# Patient Record
Sex: FEMALE | Race: WHITE | Employment: PART TIME | URBAN - METROPOLITAN AREA
[De-identification: names, ages, dates, MRNs, and addresses within clinical notes are randomized per-mention and may not be internally consistent; named-entity substitution may affect disease eponyms.]

---

## 2019-03-12 ENCOUNTER — OFFICE VISIT (OUTPATIENT)
Dept: INTERNAL MEDICINE CLINIC | Age: 29
End: 2019-03-12
Payer: COMMERCIAL

## 2019-03-12 ENCOUNTER — HOSPITAL ENCOUNTER (OUTPATIENT)
Age: 29
Setting detail: SPECIMEN
Discharge: HOME | End: 2019-03-12
Payer: COMMERCIAL

## 2019-03-12 VITALS
BODY MASS INDEX: 29.04 KG/M2 | DIASTOLIC BLOOD PRESSURE: 60 MMHG | OXYGEN SATURATION: 100 % | HEIGHT: 62 IN | WEIGHT: 157.8 LBS | HEART RATE: 79 BPM | SYSTOLIC BLOOD PRESSURE: 122 MMHG

## 2019-03-12 DIAGNOSIS — F32.A ANXIETY AND DEPRESSION: ICD-10-CM

## 2019-03-12 DIAGNOSIS — F41.9 ANXIETY AND DEPRESSION: ICD-10-CM

## 2019-03-12 DIAGNOSIS — K21.9 GASTROESOPHAGEAL REFLUX DISEASE WITHOUT ESOPHAGITIS: ICD-10-CM

## 2019-03-12 DIAGNOSIS — R63.5 WEIGHT GAIN: ICD-10-CM

## 2019-03-12 DIAGNOSIS — Z72.0 TOBACCO ABUSE: ICD-10-CM

## 2019-03-12 DIAGNOSIS — F31.9 BIPOLAR 1 DISORDER (HCC): ICD-10-CM

## 2019-03-12 DIAGNOSIS — F10.10 ETOH ABUSE: ICD-10-CM

## 2019-03-12 DIAGNOSIS — N96 RECURRENT PREGNANCY LOSS: ICD-10-CM

## 2019-03-12 DIAGNOSIS — Z71.6 TOBACCO ABUSE COUNSELING: ICD-10-CM

## 2019-03-12 DIAGNOSIS — Z76.89 ENCOUNTER TO ESTABLISH CARE WITH NEW DOCTOR: Primary | ICD-10-CM

## 2019-03-12 DIAGNOSIS — J45.40 MODERATE PERSISTENT ASTHMA WITHOUT COMPLICATION: ICD-10-CM

## 2019-03-12 DIAGNOSIS — R53.83 FATIGUE, UNSPECIFIED TYPE: ICD-10-CM

## 2019-03-12 LAB — TSH SERPL DL<=0.05 MIU/L-ACNC: 1.29 MIU/L (ref 0.3–5)

## 2019-03-12 PROCEDURE — 99204 OFFICE O/P NEW MOD 45 MIN: CPT | Performed by: FAMILY MEDICINE

## 2019-03-12 PROCEDURE — G8427 DOCREV CUR MEDS BY ELIG CLIN: HCPCS | Performed by: FAMILY MEDICINE

## 2019-03-12 PROCEDURE — 4004F PT TOBACCO SCREEN RCVD TLK: CPT | Performed by: FAMILY MEDICINE

## 2019-03-12 PROCEDURE — G8484 FLU IMMUNIZE NO ADMIN: HCPCS | Performed by: FAMILY MEDICINE

## 2019-03-12 PROCEDURE — G8419 CALC BMI OUT NRM PARAM NOF/U: HCPCS | Performed by: FAMILY MEDICINE

## 2019-03-12 RX ORDER — ALBUTEROL SULFATE 90 UG/1
2 AEROSOL, METERED RESPIRATORY (INHALATION) EVERY 6 HOURS PRN
Qty: 1 INHALER | Refills: 3 | Status: SHIPPED | OUTPATIENT
Start: 2019-03-12

## 2019-03-12 RX ORDER — OXCARBAZEPINE 150 MG/1
150 TABLET, FILM COATED ORAL 2 TIMES DAILY
Qty: 60 TABLET | Refills: 3 | Status: SHIPPED | OUTPATIENT
Start: 2019-03-12

## 2019-03-12 RX ORDER — SERTRALINE HYDROCHLORIDE 25 MG/1
25 TABLET, FILM COATED ORAL DAILY
Qty: 30 TABLET | Refills: 3 | Status: SHIPPED | OUTPATIENT
Start: 2019-03-12

## 2019-03-12 RX ORDER — FLUTICASONE PROPIONATE 110 UG/1
1 AEROSOL, METERED RESPIRATORY (INHALATION) 2 TIMES DAILY
Qty: 1 INHALER | Refills: 2 | Status: SHIPPED | OUTPATIENT
Start: 2019-03-12

## 2019-03-12 RX ORDER — PANTOPRAZOLE SODIUM 40 MG/1
40 TABLET, DELAYED RELEASE ORAL
Qty: 30 TABLET | Refills: 0 | Status: SHIPPED | OUTPATIENT
Start: 2019-03-12 | End: 2019-12-27 | Stop reason: SDUPTHER

## 2019-03-12 SDOH — HEALTH STABILITY: MENTAL HEALTH: HOW OFTEN DO YOU HAVE A DRINK CONTAINING ALCOHOL?: 2-4 TIMES A MONTH

## 2019-03-12 ASSESSMENT — ENCOUNTER SYMPTOMS
RESPIRATORY NEGATIVE: 1
ALLERGIC/IMMUNOLOGIC NEGATIVE: 1
GASTROINTESTINAL NEGATIVE: 1
EYES NEGATIVE: 1

## 2019-03-12 ASSESSMENT — PATIENT HEALTH QUESTIONNAIRE - PHQ9
SUM OF ALL RESPONSES TO PHQ QUESTIONS 1-9: 0
1. LITTLE INTEREST OR PLEASURE IN DOING THINGS: 0
SUM OF ALL RESPONSES TO PHQ QUESTIONS 1-9: 0
2. FEELING DOWN, DEPRESSED OR HOPELESS: 0
SUM OF ALL RESPONSES TO PHQ9 QUESTIONS 1 & 2: 0

## 2019-12-27 ENCOUNTER — OFFICE VISIT (OUTPATIENT)
Dept: INTERNAL MEDICINE CLINIC | Age: 29
End: 2019-12-27
Payer: COMMERCIAL

## 2019-12-27 VITALS
WEIGHT: 140.2 LBS | OXYGEN SATURATION: 99 % | HEART RATE: 69 BPM | SYSTOLIC BLOOD PRESSURE: 90 MMHG | RESPIRATION RATE: 18 BRPM | BODY MASS INDEX: 25.8 KG/M2 | TEMPERATURE: 97.4 F | DIASTOLIC BLOOD PRESSURE: 70 MMHG | HEIGHT: 62 IN

## 2019-12-27 DIAGNOSIS — F10.10 ETOH ABUSE: ICD-10-CM

## 2019-12-27 DIAGNOSIS — K21.00 GASTROESOPHAGEAL REFLUX DISEASE WITH ESOPHAGITIS: ICD-10-CM

## 2019-12-27 DIAGNOSIS — J45.40 MODERATE PERSISTENT ASTHMA WITHOUT COMPLICATION: ICD-10-CM

## 2019-12-27 DIAGNOSIS — F41.9 ANXIETY AND DEPRESSION: ICD-10-CM

## 2019-12-27 DIAGNOSIS — Z71.6 TOBACCO ABUSE COUNSELING: ICD-10-CM

## 2019-12-27 DIAGNOSIS — F31.9 BIPOLAR 1 DISORDER (HCC): ICD-10-CM

## 2019-12-27 DIAGNOSIS — F32.A ANXIETY AND DEPRESSION: ICD-10-CM

## 2019-12-27 DIAGNOSIS — M54.50 CHRONIC BILATERAL LOW BACK PAIN WITHOUT SCIATICA: ICD-10-CM

## 2019-12-27 DIAGNOSIS — Z72.0 TOBACCO ABUSE: ICD-10-CM

## 2019-12-27 DIAGNOSIS — G89.29 CHRONIC BILATERAL LOW BACK PAIN WITHOUT SCIATICA: ICD-10-CM

## 2019-12-27 DIAGNOSIS — F17.200 CURRENT SMOKER: ICD-10-CM

## 2019-12-27 DIAGNOSIS — J45.20 MILD INTERMITTENT ASTHMA WITHOUT COMPLICATION: ICD-10-CM

## 2019-12-27 DIAGNOSIS — Z91.199 MEDICALLY NONCOMPLIANT: ICD-10-CM

## 2019-12-27 DIAGNOSIS — G43.009 MIGRAINE WITHOUT AURA AND WITHOUT STATUS MIGRAINOSUS, NOT INTRACTABLE: ICD-10-CM

## 2019-12-27 DIAGNOSIS — R13.10 DYSPHAGIA, UNSPECIFIED TYPE: Primary | ICD-10-CM

## 2019-12-27 PROBLEM — Z76.89 ENCOUNTER TO ESTABLISH CARE WITH NEW DOCTOR: Status: RESOLVED | Noted: 2019-03-12 | Resolved: 2019-12-27

## 2019-12-27 PROBLEM — R53.83 FATIGUE: Status: RESOLVED | Noted: 2019-03-12 | Resolved: 2019-12-27

## 2019-12-27 PROCEDURE — G8484 FLU IMMUNIZE NO ADMIN: HCPCS | Performed by: FAMILY MEDICINE

## 2019-12-27 PROCEDURE — G8419 CALC BMI OUT NRM PARAM NOF/U: HCPCS | Performed by: FAMILY MEDICINE

## 2019-12-27 PROCEDURE — G8427 DOCREV CUR MEDS BY ELIG CLIN: HCPCS | Performed by: FAMILY MEDICINE

## 2019-12-27 PROCEDURE — 99214 OFFICE O/P EST MOD 30 MIN: CPT | Performed by: FAMILY MEDICINE

## 2019-12-27 PROCEDURE — 4004F PT TOBACCO SCREEN RCVD TLK: CPT | Performed by: FAMILY MEDICINE

## 2019-12-27 ASSESSMENT — ENCOUNTER SYMPTOMS
GLOBUS SENSATION: 1
RESPIRATORY NEGATIVE: 1
ALLERGIC/IMMUNOLOGIC NEGATIVE: 1
EYES NEGATIVE: 1

## 2019-12-30 ENCOUNTER — TELEPHONE (OUTPATIENT)
Dept: GASTROENTEROLOGY | Age: 29
End: 2019-12-30

## 2019-12-31 RX ORDER — PANTOPRAZOLE SODIUM 40 MG/1
40 TABLET, DELAYED RELEASE ORAL
Qty: 30 TABLET | Refills: 2 | Status: SHIPPED | OUTPATIENT
Start: 2019-12-31

## 2020-01-09 ENCOUNTER — TELEPHONE (OUTPATIENT)
Dept: GASTROENTEROLOGY | Age: 30
End: 2020-01-09

## 2020-01-09 NOTE — TELEPHONE ENCOUNTER
Lvm to call and schedule appt from referral - 2nd attempt  Letter sent to call and schedule appt - 3rd attempt

## 2020-05-15 LAB
ABSOLUTE IMMATURE GRANULOCYTE: 0 10*3/UL (ref 0–0.2)
BASOPHILS ABSOLUTE: 0.1 10*3/UL (ref 0–0.2)
BASOPHILS RELATIVE PERCENT: 0.4 % (ref 0–1)
BUN BLDV-MCNC: 5 MG/DL (ref 7–25)
CALCIUM SERPL-MCNC: 9.4 MG/DL (ref 8.6–10.3)
CHLORIDE BLD-SCNC: 104 MEQ/L (ref 98–107)
CO2: 30 MEQ/L (ref 21–31)
CREAT SERPL-MCNC: 0.78 MG/DL (ref 0.6–1.2)
EGFR AFRICAN AMERICAN: >60 ML/MIN/1.73SQ M
EGFR IF NONAFRICAN AMERICAN: >60 ML/MIN/1.73SQ M
EOSINOPHILS ABSOLUTE: 0.3 10*3/UL (ref 0–0.5)
EOSINOPHILS RELATIVE PERCENT: 2.4 % (ref 0–6)
GLUCOSE: 74 MG/DL (ref 70–100)
HCT VFR BLD CALC: 44.1 % (ref 36–45)
HEMOGLOBIN: 13.9 G/DL (ref 12–15)
IMMATURE GRANULOCYTES: 0.2 % (ref 0–1)
INR BLD: 1.07 (ref 0.91–1.16)
LYMPHOCYTES ABSOLUTE: 2.3 10*3/UL (ref 1.2–4)
LYMPHOCYTES RELATIVE PERCENT: 18.2 % (ref 20–45)
MCH RBC QN AUTO: 27.9 PG (ref 27–33)
MCHC RBC AUTO-ENTMCNC: 31.5 G/DL (ref 32–35)
MCV RBC AUTO: 88.4 FL (ref 82–98)
MONOCYTES ABSOLUTE: 0.8 10*3/UL (ref 0.1–1)
MONOCYTES RELATIVE PERCENT: 6.7 % (ref 5–12)
NEUTROPHILS ABSOLUTE: 8.9 10*3/UL (ref 1.6–7.6)
NEUTROPHILS RELATIVE PERCENT: 72.1 % (ref 40–72)
NUCLEATED RED BLOOD CELLS: 0 % (ref 0–0)
PDW BLD-RTO: 14 % (ref 11.5–15)
PLATELET # BLD: 295 10*3/UL (ref 150–400)
POTASSIUM SERPL-SCNC: 3.6 MEQ/L (ref 3.5–5.1)
PT: 13.9 SEC (ref 12.3–14.8)
RBC: 4.99 10*6/UL (ref 3.8–5)
SODIUM BLD-SCNC: 138 MEQ/L (ref 136–145)
WBC: 12.34 10*3/UL (ref 4–10.6)

## 2020-05-17 LAB — CLINICAL REPORT: NORMAL

## 2024-08-12 ENCOUNTER — HOSPITAL ENCOUNTER (EMERGENCY)
Facility: CLINIC | Age: 34
Discharge: HOME | End: 2024-08-12
Attending: EMERGENCY MEDICINE
Payer: COMMERCIAL

## 2024-08-12 ENCOUNTER — APPOINTMENT (OUTPATIENT)
Dept: GENERAL RADIOLOGY | Facility: CLINIC | Age: 34
End: 2024-08-12
Payer: COMMERCIAL

## 2024-08-12 VITALS
WEIGHT: 135 LBS | BODY MASS INDEX: 24.84 KG/M2 | RESPIRATION RATE: 18 BRPM | OXYGEN SATURATION: 97 % | HEIGHT: 62 IN | HEART RATE: 73 BPM | DIASTOLIC BLOOD PRESSURE: 75 MMHG | SYSTOLIC BLOOD PRESSURE: 113 MMHG | TEMPERATURE: 98.4 F

## 2024-08-12 DIAGNOSIS — S83.92XA SPRAIN OF LEFT KNEE, UNSPECIFIED LIGAMENT, INITIAL ENCOUNTER: Primary | ICD-10-CM

## 2024-08-12 PROCEDURE — 99283 EMERGENCY DEPT VISIT LOW MDM: CPT

## 2024-08-12 PROCEDURE — 73562 X-RAY EXAM OF KNEE 3: CPT

## 2024-08-12 ASSESSMENT — PAIN SCALES - GENERAL: PAINLEVEL_OUTOF10: 6

## 2024-08-12 ASSESSMENT — PAIN DESCRIPTION - PAIN TYPE: TYPE: ACUTE PAIN

## 2024-08-12 ASSESSMENT — PAIN DESCRIPTION - FREQUENCY: FREQUENCY: CONTINUOUS

## 2024-08-12 ASSESSMENT — PAIN - FUNCTIONAL ASSESSMENT
PAIN_FUNCTIONAL_ASSESSMENT: ACTIVITIES ARE NOT PREVENTED
PAIN_FUNCTIONAL_ASSESSMENT: 0-10

## 2024-08-12 ASSESSMENT — PAIN DESCRIPTION - DESCRIPTORS: DESCRIPTORS: ACHING

## 2024-08-12 ASSESSMENT — PAIN DESCRIPTION - ORIENTATION: ORIENTATION: LEFT

## 2024-08-12 ASSESSMENT — PAIN DESCRIPTION - ONSET: ONSET: ON-GOING

## 2024-08-12 ASSESSMENT — PAIN DESCRIPTION - LOCATION: LOCATION: KNEE

## 2024-08-12 NOTE — ED PROVIDER NOTES
eMERGENCY dEPARTMENT eNCOUnter      Pt Name: Triny Osborne  MRN: [id number]  Birthdate 1990  Date of evaluation: 8/12/2024      CHIEF COMPLAINT       Chief Complaint   Patient presents with    Knee Pain         HISTORY OF PRESENT ILLNESS    Triny Osborne is a 33 y.o. female who presents with left knee pain.  Patient states on Saturday she has fallen off of a JetSki in the process getting back and she heard a pop to her left knee been complaining of pain to the area denies any numbness tingling weakness she is able to ambulate        REVIEW OF SYSTEMS       Positive for left knee pain negative for numbness tingling weakness or loss of function    PAST MEDICAL HISTORY    has a past medical history of Asthma, Depression, and Ovarian cyst.    SURGICAL HISTORY      has no past surgical history on file.    CURRENT MEDICATIONS       Previous Medications    ALBUTEROL SULFATE  (90 BASE) MCG/ACT INHALER    Inhale 2 puffs into the lungs every 6 hours as needed for Wheezing    FLUTICASONE (FLOVENT HFA) 110 MCG/ACT INHALER    Inhale 1 puff into the lungs 2 times daily    OXCARBAZEPINE (TRILEPTAL) 150 MG TABLET    Take 1 tablet by mouth 2 times daily    PANTOPRAZOLE (PROTONIX) 40 MG TABLET    Take 1 tablet by mouth every morning (before breakfast)    SERTRALINE (ZOLOFT) 25 MG TABLET    Take 1 tablet by mouth daily       ALLERGIES     is allergic to pcn [penicillins].    FAMILY HISTORY     has no family status information on file.      family history includes Asthma in her sister.    SOCIAL HISTORY      reports that she has been smoking cigarettes. She started smoking about 21 years ago. She has a 10.8 pack-year smoking history. She has never used smokeless tobacco. She reports current alcohol use of about 3.0 - 4.0 standard drinks of alcohol per week. She reports that she does not use drugs.    PHYSICAL EXAM     INITIAL VITALS:  height is 1.575 m (5' 2\") and weight is 61.2 kg (135 lb). Her oral temperature is 98.4 °F (36.9

## 2024-08-12 NOTE — ED NOTES
Pt presents to ED c/o left knee pain since Saturday. Pt states she slipped while climbing on to a jet ski and hit her knee on the back of the jet ski. No obvious injury, bruising, swelling, or deformity noted. Pt ambulates unassisted with a slight limp. Pt arrives A/Ox4, PWD, PMS intact. Pt resting on stretcher with call light in reach.